# Patient Record
Sex: MALE | URBAN - NONMETROPOLITAN AREA
[De-identification: names, ages, dates, MRNs, and addresses within clinical notes are randomized per-mention and may not be internally consistent; named-entity substitution may affect disease eponyms.]

---

## 2020-06-17 ENCOUNTER — OFFICE VISIT (OUTPATIENT)
Dept: URBAN - NONMETROPOLITAN AREA CLINIC 2 | Facility: CLINIC | Age: 70
End: 2020-06-17

## 2022-01-01 ENCOUNTER — TELEPHONE ENCOUNTER (OUTPATIENT)
Dept: URBAN - METROPOLITAN AREA CLINIC 92 | Facility: CLINIC | Age: 72
End: 2022-01-01

## 2022-01-01 ENCOUNTER — TELEPHONE ENCOUNTER (OUTPATIENT)
Dept: URBAN - NONMETROPOLITAN AREA CLINIC 2 | Facility: CLINIC | Age: 72
End: 2022-01-01

## 2022-01-01 ENCOUNTER — CLAIMS CREATED FROM THE CLAIM WINDOW (OUTPATIENT)
Dept: URBAN - NONMETROPOLITAN AREA CLINIC 2 | Facility: CLINIC | Age: 72
End: 2022-01-01
Payer: MEDICARE

## 2022-01-01 ENCOUNTER — LAB OUTSIDE AN ENCOUNTER (OUTPATIENT)
Dept: URBAN - NONMETROPOLITAN AREA CLINIC 2 | Facility: CLINIC | Age: 72
End: 2022-01-01

## 2022-01-01 ENCOUNTER — LAB OUTSIDE AN ENCOUNTER (OUTPATIENT)
Dept: URBAN - METROPOLITAN AREA CLINIC 92 | Facility: CLINIC | Age: 72
End: 2022-01-01

## 2022-01-01 ENCOUNTER — OFFICE VISIT (OUTPATIENT)
Dept: URBAN - NONMETROPOLITAN AREA CLINIC 2 | Facility: CLINIC | Age: 72
End: 2022-01-01

## 2022-01-01 ENCOUNTER — WEB ENCOUNTER (OUTPATIENT)
Dept: URBAN - NONMETROPOLITAN AREA CLINIC 2 | Facility: CLINIC | Age: 72
End: 2022-01-01

## 2022-01-01 ENCOUNTER — DASHBOARD ENCOUNTERS (OUTPATIENT)
Age: 72
End: 2022-01-01

## 2022-01-01 ENCOUNTER — LAB OUTSIDE AN ENCOUNTER (OUTPATIENT)
Dept: URBAN - METROPOLITAN AREA CLINIC 13 | Facility: CLINIC | Age: 72
End: 2022-01-01

## 2022-01-01 VITALS
TEMPERATURE: 97.7 F | WEIGHT: 193.6 LBS | WEIGHT: 195.6 LBS | HEIGHT: 70 IN | SYSTOLIC BLOOD PRESSURE: 144 MMHG | BODY MASS INDEX: 28 KG/M2 | HEART RATE: 78 BPM | SYSTOLIC BLOOD PRESSURE: 155 MMHG | HEART RATE: 86 BPM | DIASTOLIC BLOOD PRESSURE: 76 MMHG | HEIGHT: 70 IN | BODY MASS INDEX: 27.72 KG/M2 | DIASTOLIC BLOOD PRESSURE: 84 MMHG | TEMPERATURE: 97.6 F

## 2022-01-01 DIAGNOSIS — R10.84 GENERALIZED ABDOMINAL PAIN: ICD-10-CM

## 2022-01-01 DIAGNOSIS — R93.89 ABNORMAL CT SCAN: ICD-10-CM

## 2022-01-01 DIAGNOSIS — R19.7 DIARRHEA, UNSPECIFIED TYPE: ICD-10-CM

## 2022-01-01 DIAGNOSIS — Z12.11 COLON CANCER SCREENING: ICD-10-CM

## 2022-01-01 DIAGNOSIS — Z86.010 PERSONAL HISTORY OF COLONIC POLYPS: ICD-10-CM

## 2022-01-01 DIAGNOSIS — R14.0 BLOATING: ICD-10-CM

## 2022-01-01 DIAGNOSIS — K83.8 BILE DUCT LEAK: ICD-10-CM

## 2022-01-01 LAB
A/G RATIO: 1.9
ALBUMIN: 4.1
ALKALINE PHOSPHATASE: 88
ALT (SGPT): 7
AMYLASE: 54
AST (SGOT): 14
BILIRUBIN, TOTAL: 0.2
BUN/CREATININE RATIO: 12
BUN: 12
C DIFFICILE TOXIN GENE NAA: NEGATIVE
CALCIUM: 9.8
CAMPYLOBACTER CULTURE: (no result)
CARBON DIOXIDE, TOTAL: 22
CHLORIDE: 100
CREATININE: 0.97
E COLI SHIGA TOXIN EIA: NEGATIVE
EGFR: 83
GLOBULIN, TOTAL: 2.2
GLUCOSE: 227
HEMATOCRIT: 44.3
HEMOGLOBIN: 14.1
LIPASE: 110
Lab: (no result)
MCH: 27.5
MCHC: 31.8
MCV: 87
NRBC: (no result)
OVA + PARASITE EXAM: (no result)
PANCREATIC ELASTASE, FECAL: 107
PLATELETS: 331
POTASSIUM: 4.2
PROTEIN, TOTAL: 6.3
RBC: 5.12
RDW: 14.3
SALMONELLA/SHIGELLA SCREEN: (no result)
SODIUM: 139
WBC: 10
WHITE BLOOD CELLS (WBC), STOOL: (no result)

## 2022-01-01 PROCEDURE — 99214 OFFICE O/P EST MOD 30 MIN: CPT | Performed by: NURSE PRACTITIONER

## 2022-01-01 RX ORDER — DICYCLOMINE HYDROCHLORIDE 10 MG/1
1 TABLET 30 MINS PRIOR TO A MEAL FOR CRAMPING/DIARRHEA CAPSULE ORAL THREE TIMES A DAY
Qty: 90 TABLET | Refills: 6 | OUTPATIENT

## 2022-01-01 RX ORDER — LEVOTHYROXINE SODIUM 50 UG/1
1 TABLET IN THE MORNING ON AN EMPTY STOMACH TABLET ORAL ONCE A DAY
Status: ACTIVE | COMMUNITY

## 2022-01-01 RX ORDER — LOPERAMIDE HYDROCHLORIDE 2 MG/1
1 CAPSULE AS NEEDED CAPSULE ORAL
Status: ACTIVE | COMMUNITY

## 2022-01-01 RX ORDER — DICYCLOMINE HYDROCHLORIDE 10 MG/1
1 TABLET 30 MINS PRIOR TO A MEAL FOR CRAMPING/DIARRHEA CAPSULE ORAL THREE TIMES A DAY
Qty: 90 TABLET | Refills: 6 | Status: ACTIVE | COMMUNITY

## 2022-01-01 RX ORDER — METFORMIN HYDROCHLORIDE 1000 MG/1
1 TABLET WITH A MEAL TABLET, FILM COATED ORAL ONCE A DAY
Status: ACTIVE | COMMUNITY

## 2022-01-01 RX ORDER — METOPROLOL TARTRATE 25 MG/1
1 TABLET WITH FOOD TABLET, FILM COATED ORAL TWICE A DAY
Status: ACTIVE | COMMUNITY

## 2022-01-01 RX ORDER — GLIPIZIDE 10 MG/1
1 TABLET 30 MINUTES BEFORE BREAKFAST TABLET ORAL ONCE A DAY
Status: ACTIVE | COMMUNITY

## 2022-01-01 RX ORDER — AMLODIPINE BESYLATE 5 MG/1
1 TABLET TABLET ORAL ONCE A DAY
Status: ACTIVE | COMMUNITY

## 2022-01-01 RX ORDER — ASPIRIN 81 MG/1
1 TABLET TABLET, COATED ORAL ONCE A DAY
Status: ACTIVE | COMMUNITY

## 2022-01-01 RX ORDER — ONDANSETRON 8 MG/1
1 TABLET ON THE TONGUE AND ALLOW TO DISSOLVE  AS NEEDED TABLET, ORALLY DISINTEGRATING ORAL ONCE A DAY
Status: ACTIVE | COMMUNITY

## 2022-01-01 RX ORDER — FLUTICASONE PROPIONATE 50 UG/1
1 SPRAY IN EACH NOSTRIL SPRAY, METERED NASAL ONCE A DAY
Status: ACTIVE | COMMUNITY

## 2022-01-01 RX ORDER — OMEPRAZOLE 20 MG/1
1 CAPSULE 30 MINUTES BEFORE MORNING MEAL CAPSULE, DELAYED RELEASE ORAL ONCE A DAY
Status: ACTIVE | COMMUNITY

## 2022-02-28 NOTE — HPI-TODAY'S VISIT:
2/28/2022 Mr. Umair Del Rio is a 71 year old male here for pancreatitis. He was last seen in 2020 for bile leak post CCY and need for colonoscopy after dx of Stage 4 lung cancer which showed scant leftsided diverticulosis and 4 TA polyps at 60cm. Today, he reports he was never told he had pancreatitis. He thinks he has EPI. He has upper abdominal discomfort and bloating. He will have a pain at times that radiates to his back. He was having about 10 BM a day that were like water. He is now having about 1-2. These are like sand with no form. He will see mucous and oil at times. He was given creon to take three times a day. He has been on this for 3 days. He is not sure if it is helping and it is expensive. CS

## 2022-04-13 NOTE — HPI-OTHER HISTORIES
2/28/2022 Mr. Umair Del Rio is a 71 year old male here for pancreatitis. He was last seen in 2020 for bile leak post CCY and need for colonoscopy after dx of Stage 4 lung cancer which showed scant leftsided diverticulosis and 4 TA polyps at 60cm. Today, he reports he was never told he had pancreatitis. He thinks he has EPI. He has upper abdominal discomfort and bloating. He will have a pain at times that radiates to his back. He was having about 10 BM a day that were like water. He is now having about 1-2. These are like sand with no form. He will see mucous and oil at times. He was given creon to take three times a day. He has been on this for 3 days. He is not sure if it is helping and it is expensive. CS  CT of the Abdomen: Normal pancreas. Small hypodensities in the liver and omentum stranding- stable with 3/8 CT with Dr Quinteros. Gastric wallthickening and esophageal wall thickening  Pancreatitic elastase: 107, moderate pancreatic insufficiency

## 2022-04-13 NOTE — HPI-TODAY'S VISIT:
4/13/2022 Mr. Umair Del Rio is here for f/u of pancreatitis. He was last seen in 2020 for bile leak post CCY and need for colonoscopy after dx of Stage 4 lung cancer which showed scant leftsided diverticulosis and 4 TA polyps at 60cm. At his last OV,  he was concerned he had EPI. He has upper abdominal discomfort and bloating. He will have a pain at times that radiates to his back. He was having about 10 BM a day. These were like sand with no form. He will see mucous and oil at times. He had a CT scan with a normal pancreas. He was started on creon. His bowels are now normal but hard to get clean. His bloating is better with cutting back on sodas and not eating breakfast. His CT scan showed gastric and esophageal wall thickening. He is taking antacid and it is helping as well. He had an EGD in [place] about 2-3 years ago and told his stomach was coated with black stuff. CS

## 2022-05-25 PROBLEM — 428283002: Status: ACTIVE | Noted: 2022-01-01

## 2022-05-25 PROBLEM — 235932003: Status: ACTIVE | Noted: 2022-01-01

## 2022-05-25 PROBLEM — 129679001: Status: ACTIVE | Noted: 2022-01-01

## 2022-05-25 NOTE — HPI-TODAY'S VISIT:
4/13/2022 Mr. Umair Del Rio is here for f/u of pancreatitis. He was last seen in 2020 for bile leak post CCY and need for colonoscopy after dx of Stage 4 lung cancer which showed scant leftsided diverticulosis and 4 TA polyps at 60cm. At his last OV,  he was concerned he had EPI. He has upper abdominal discomfort and bloating. He will have a pain at times that radiates to his back. He was having about 10 BM a day. These were like sand with no form. He will see mucous and oil at times. He had a CT scan with a normal pancreas. He was started on creon. His bowels are now normal but hard to get clean. His bloating is better with cutting back on sodas and not eating breakfast. His CT scan showed gastric and esophageal wall thickening. He is taking antacid and it is helping as well. He had an EGD in [place] about 2-3 years ago and told his stomach was coated with black stuff. CS

## 2022-06-21 NOTE — PHYSICAL EXAM GASTROINTESTINAL
Abdomen , soft, nontender, nondistended , no guarding or rigidity , no masses palpable , normal bowel sounds , Liver and Spleen , no hepatosplenomegaly , liver nontender , spleen not palpable No

## 2022-06-22 ENCOUNTER — OFFICE VISIT (OUTPATIENT)
Dept: URBAN - NONMETROPOLITAN AREA SURGERY CENTER 1 | Facility: SURGERY CENTER | Age: 72
End: 2022-06-22

## 2022-07-13 ENCOUNTER — OFFICE VISIT (OUTPATIENT)
Dept: URBAN - NONMETROPOLITAN AREA CLINIC 2 | Facility: CLINIC | Age: 72
End: 2022-07-13

## 2025-07-01 NOTE — PHYSICAL EXAM HENT:
Head,  normocephalic,  atraumatic,  Face,  Face within normal limits,  Ears,  External ears within normal limits,  Nose/Nasopharynx,  External nose  normal appearance,  Lips,  Appearance normal
If you are a smoker, it is important for your health to stop smoking. Please be aware that second hand smoke is also harmful.